# Patient Record
Sex: FEMALE | Race: WHITE
[De-identification: names, ages, dates, MRNs, and addresses within clinical notes are randomized per-mention and may not be internally consistent; named-entity substitution may affect disease eponyms.]

---

## 2018-02-26 ENCOUNTER — HOSPITAL ENCOUNTER (INPATIENT)
Dept: HOSPITAL 74 - FM/S | Age: 78
LOS: 2 days | Discharge: HOME HEALTH | DRG: 470 | End: 2018-02-28
Attending: ORTHOPAEDIC SURGERY | Admitting: ORTHOPAEDIC SURGERY
Payer: COMMERCIAL

## 2018-02-26 VITALS — BODY MASS INDEX: 26.9 KG/M2

## 2018-02-26 DIAGNOSIS — Z87.891: ICD-10-CM

## 2018-02-26 DIAGNOSIS — E03.9: ICD-10-CM

## 2018-02-26 DIAGNOSIS — M17.12: Primary | ICD-10-CM

## 2018-02-26 PROCEDURE — 0SRD0JZ REPLACEMENT OF LEFT KNEE JOINT WITH SYNTHETIC SUBSTITUTE, OPEN APPROACH: ICD-10-PCS | Performed by: ORTHOPAEDIC SURGERY

## 2018-02-26 PROCEDURE — 8E0Y0CZ ROBOTIC ASSISTED PROCEDURE OF LOWER EXTREMITY, OPEN APPROACH: ICD-10-PCS | Performed by: ORTHOPAEDIC SURGERY

## 2018-02-26 RX ADMIN — ACETAMINOPHEN SCH: 325 TABLET ORAL at 20:00

## 2018-02-26 RX ADMIN — CELECOXIB SCH MG: 200 CAPSULE ORAL at 21:41

## 2018-02-26 RX ADMIN — GABAPENTIN SCH MG: 300 CAPSULE ORAL at 21:41

## 2018-02-26 RX ADMIN — OXYCODONE HYDROCHLORIDE AND ACETAMINOPHEN SCH MG: 500 TABLET ORAL at 21:41

## 2018-02-26 RX ADMIN — KETOROLAC TROMETHAMINE SCH MG: 30 INJECTION INTRAMUSCULAR; INTRAVENOUS at 18:20

## 2018-02-26 RX ADMIN — OXYCODONE HYDROCHLORIDE SCH MG: 10 TABLET, FILM COATED, EXTENDED RELEASE ORAL at 21:41

## 2018-02-26 RX ADMIN — DOCUSATE SODIUM,SENNOSIDES SCH TABLET: 50; 8.6 TABLET, FILM COATED ORAL at 21:40

## 2018-02-26 RX ADMIN — ONDANSETRON PRN MG: 2 INJECTION INTRAMUSCULAR; INTRAVENOUS at 19:38

## 2018-02-26 NOTE — HP
Admitting History and Physical





- Admission


Chief Complaint: left knee osteoarthritis x years


History of Present Illness: 


77-year-old female presenting in regard to her left knee. Long-standing history 

of knee osteoarthritis. Patient complains of pain, limited range of motion, 

difficulty ambulating, and difficulty completing activities of daily living. 

Patient has failed conservative treatment including PO medication, injections, 

activity modification, and exercise program. Patient would like to proceed with 

a left total knee arthroplasty, MAKOplasty.





History Source: Patient





- Past Medical History


Endocrine: Yes: Hypothyroidism





- Past Surgical History


Additional Past Surgical History: 


See written history and physical.








- Advance Directives


Advance Directives: Yes: Health Care Proxy





- Smoking History


Smoking history: Former smoker


Have you smoked in the past 12 months: No


Aproximately how many cigarettes per day: 2


If you are a former smoker, when did you quit?: 1990





- Alcohol/Substance Use


Hx Alcohol Use: Yes (RARE WINE ON A HOLIDAY)





Home Medications





- Allergies


Allergies/Adverse Reactions: 


 Allergies











Allergy/AdvReac Type Severity Reaction Status Date / Time


 


No Known Allergies Allergy   Verified 02/19/18 12:24














- Home Medications


Home Medications: 


Ambulatory Orders





Levothyroxine [Synthroid -] 112 mcg PO DAILY 02/19/18 











Review of Systems





- Review of Systems


Musculoskeletal: reports: Crepitus (left knee), Decreased ROM (left knee), 

Joint Pain (left knee), Joint Swelling (left knee)





Physical Examination


Constitutional: Yes: Well Nourished, No Distress


Eyes: Yes: Conjunctiva Clear


HENT: Yes: Atraumatic, Normocephalic


Neck: Yes: Supple


Cardiovascular: Yes: Regular Rate and Rhythm


Respiratory: Yes: Regular


Gastrointestinal: Yes: Soft


...Rectal Exam: Yes: Deferred


Musculoskeletal: Yes: Joint Stiffness (left knee), Joint Swelling (left knee)





Assessment/Plan


77-year-old female presenting in regard to her left knee. Long-standing history 

of left knee osteoarthritis. Patient complains of pain, limited range of motion

, difficulty ambulating, and difficulty completing activities of daily living. 

Patient has failed conservative treatment including PO medication, injections, 

activity modification, and exercise program. Pros, cons, wrists, and benefits 

of a left total knee arthroplasty, MAKOplasty were explained in detail to the 

patient. Patient confirms her understanding and wishes to proceed with a left 

total knee arthroplasty, MAKOplasty.

## 2018-02-26 NOTE — OP
Operative Note





- Note:


Operative Date: 02/26/18


Pre-Operative Diagnosis: Left knee OA


Operation: MAKOplasty left TKA


Post-Operative Diagnosis: Same as Pre-op


Surgeon: Brian Lemus


Assistant: Jacqueline Roque


Anesthesia: Spinal


Estimated Blood Loss (mls): 100

## 2018-02-27 VITALS — HEART RATE: 70 BPM

## 2018-02-27 LAB
ANION GAP SERPL CALC-SCNC: 4 MMOL/L (ref 8–16)
BUN SERPL-MCNC: 19 MG/DL (ref 7–18)
CALCIUM SERPL-MCNC: 8.4 MG/DL (ref 8.4–10.2)
CHLORIDE SERPL-SCNC: 107 MMOL/L (ref 98–107)
CO2 SERPL-SCNC: 25 MMOL/L (ref 22–28)
CREAT SERPL-MCNC: 0.8 MG/DL (ref 0.6–1.3)
DEPRECATED RDW RBC AUTO: 13.3 % (ref 11.6–15.6)
GLUCOSE SERPL-MCNC: 162 MG/DL (ref 74–106)
HCT VFR BLD CALC: 32.8 % (ref 32.4–45.2)
HGB BLD-MCNC: 11.6 GM/DL (ref 10.7–15.3)
MCH RBC QN AUTO: 31 PG (ref 25.7–33.7)
MCHC RBC AUTO-ENTMCNC: 35.4 G/DL (ref 32–36)
MCV RBC: 87.7 FL (ref 80–96)
PLATELET # BLD AUTO: 271 K/MM3 (ref 134–434)
PMV BLD: 7.2 FL (ref 7.5–11.1)
POTASSIUM SERPLBLD-SCNC: 4.1 MMOL/L (ref 3.5–5.1)
RBC # BLD AUTO: 3.75 M/MM3 (ref 3.6–5.2)
SODIUM SERPL-SCNC: 136 MMOL/L (ref 136–145)
WBC # BLD AUTO: 11.9 K/MM3 (ref 4–10.8)

## 2018-02-27 RX ADMIN — DOCUSATE SODIUM,SENNOSIDES SCH TABLET: 50; 8.6 TABLET, FILM COATED ORAL at 21:49

## 2018-02-27 RX ADMIN — ACETAMINOPHEN SCH MG: 325 TABLET ORAL at 17:55

## 2018-02-27 RX ADMIN — ACETAMINOPHEN SCH MG: 325 TABLET ORAL at 06:19

## 2018-02-27 RX ADMIN — PANTOPRAZOLE SODIUM SCH MG: 40 TABLET, DELAYED RELEASE ORAL at 10:06

## 2018-02-27 RX ADMIN — CELECOXIB SCH MG: 200 CAPSULE ORAL at 10:06

## 2018-02-27 RX ADMIN — KETOROLAC TROMETHAMINE SCH MG: 30 INJECTION INTRAMUSCULAR; INTRAVENOUS at 00:02

## 2018-02-27 RX ADMIN — MULTIVITAMIN TABLET SCH TAB: TABLET at 10:06

## 2018-02-27 RX ADMIN — ONDANSETRON PRN MG: 2 INJECTION INTRAMUSCULAR; INTRAVENOUS at 06:18

## 2018-02-27 RX ADMIN — ASPIRIN 325 MG ORAL TABLET SCH MG: 325 PILL ORAL at 08:06

## 2018-02-27 RX ADMIN — KETOROLAC TROMETHAMINE SCH MG: 30 INJECTION INTRAMUSCULAR; INTRAVENOUS at 06:20

## 2018-02-27 RX ADMIN — ACETAMINOPHEN SCH MG: 325 TABLET ORAL at 12:10

## 2018-02-27 RX ADMIN — ACETAMINOPHEN SCH MG: 325 TABLET ORAL at 00:02

## 2018-02-27 RX ADMIN — OXYCODONE HYDROCHLORIDE AND ACETAMINOPHEN SCH MG: 500 TABLET ORAL at 10:06

## 2018-02-27 RX ADMIN — OXYCODONE HYDROCHLORIDE SCH MG: 10 TABLET, FILM COATED, EXTENDED RELEASE ORAL at 21:50

## 2018-02-27 RX ADMIN — CELECOXIB SCH MG: 200 CAPSULE ORAL at 21:49

## 2018-02-27 RX ADMIN — GABAPENTIN SCH MG: 300 CAPSULE ORAL at 10:06

## 2018-02-27 RX ADMIN — OXYCODONE HYDROCHLORIDE AND ACETAMINOPHEN SCH MG: 500 TABLET ORAL at 21:49

## 2018-02-27 RX ADMIN — LEVOTHYROXINE SODIUM SCH MCG: 112 TABLET ORAL at 06:19

## 2018-02-27 RX ADMIN — GABAPENTIN SCH MG: 300 CAPSULE ORAL at 21:49

## 2018-02-27 RX ADMIN — DOCUSATE SODIUM,SENNOSIDES SCH TABLET: 50; 8.6 TABLET, FILM COATED ORAL at 10:07

## 2018-02-27 NOTE — PN
Progress Note (short form)





- Note


Progress Note: 





77F POD1 s/p L TKR under spinal anesthetic and peripheral nerve blocks for post 

operative pain relief.


Pt states that pain is well controlled, reports no anesthetic complications. 


AVSS. Sensory and motor function intact in bilateral lower extremities.


Continue current regimen.

## 2018-02-27 NOTE — PN
Progress Note (short form)





- Note


Progress Note: 





Pt seen and examined.  Doing well.





AVSS





 Selected Entries











  02/27/18 02/27/18





  20:54 22:04


 


Temperature  98.0 F


 


Pulse Rate  70


 


Respiratory  18





Rate  


 


Blood Pressure  97/50


 


O2 Sat by Pulse 96 95





Oximetry (%)  








 Laboratory Tests











  02/27/18 02/27/18





  07:40 07:40


 


WBC  11.9 H 


 


Hgb  11.6 


 


Hct  32.8 


 


Plt Count  271 


 


Sodium   136


 


Potassium   4.1


 


Chloride   107


 


Carbon Dioxide   25


 


Anion Gap   4 L


 


BUN   19 H


 


Creatinine   0.8


 


Random Glucose   162 H


 


Calcium   8.4











Gen: NAD





LLE:  c/d/i, NVID





A/P 78yo female POD#1 s/p L JAYY TKA





1.  PT/OOB





2.  D/C in AM after PT; f/u in office in 10-14 days.

## 2018-02-28 VITALS — TEMPERATURE: 98.1 F | SYSTOLIC BLOOD PRESSURE: 106 MMHG | DIASTOLIC BLOOD PRESSURE: 51 MMHG

## 2018-02-28 LAB
ANION GAP SERPL CALC-SCNC: 3 MMOL/L (ref 8–16)
BUN SERPL-MCNC: 23 MG/DL (ref 7–18)
CALCIUM SERPL-MCNC: 8.2 MG/DL (ref 8.4–10.2)
CHLORIDE SERPL-SCNC: 107 MMOL/L (ref 98–107)
CO2 SERPL-SCNC: 27 MMOL/L (ref 22–28)
CREAT SERPL-MCNC: 0.8 MG/DL (ref 0.6–1.3)
DEPRECATED RDW RBC AUTO: 13.3 % (ref 11.6–15.6)
GLUCOSE SERPL-MCNC: 85 MG/DL (ref 74–106)
HCT VFR BLD CALC: 30.2 % (ref 32.4–45.2)
HGB BLD-MCNC: 10.3 GM/DL (ref 10.7–15.3)
MCH RBC QN AUTO: 30 PG (ref 25.7–33.7)
MCHC RBC AUTO-ENTMCNC: 34 G/DL (ref 32–36)
MCV RBC: 88.2 FL (ref 80–96)
PLATELET # BLD AUTO: 258 K/MM3 (ref 134–434)
PMV BLD: 7.3 FL (ref 7.5–11.1)
POTASSIUM SERPLBLD-SCNC: 3.7 MMOL/L (ref 3.5–5.1)
RBC # BLD AUTO: 3.43 M/MM3 (ref 3.6–5.2)
SODIUM SERPL-SCNC: 137 MMOL/L (ref 136–145)
WBC # BLD AUTO: 9.8 K/MM3 (ref 4–10.8)

## 2018-02-28 RX ADMIN — LEVOTHYROXINE SODIUM SCH MCG: 112 TABLET ORAL at 06:56

## 2018-02-28 RX ADMIN — CELECOXIB SCH MG: 200 CAPSULE ORAL at 09:50

## 2018-02-28 RX ADMIN — OXYCODONE HYDROCHLORIDE AND ACETAMINOPHEN SCH MG: 500 TABLET ORAL at 09:51

## 2018-02-28 RX ADMIN — GABAPENTIN SCH MG: 300 CAPSULE ORAL at 09:51

## 2018-02-28 RX ADMIN — DOCUSATE SODIUM,SENNOSIDES SCH TABLET: 50; 8.6 TABLET, FILM COATED ORAL at 09:52

## 2018-02-28 RX ADMIN — ACETAMINOPHEN SCH MG: 325 TABLET ORAL at 06:55

## 2018-02-28 RX ADMIN — MULTIVITAMIN TABLET SCH TAB: TABLET at 09:52

## 2018-02-28 RX ADMIN — ACETAMINOPHEN SCH: 325 TABLET ORAL at 05:52

## 2018-02-28 RX ADMIN — OXYCODONE HYDROCHLORIDE SCH MG: 10 TABLET, FILM COATED, EXTENDED RELEASE ORAL at 09:50

## 2018-02-28 RX ADMIN — ASPIRIN 325 MG ORAL TABLET SCH MG: 325 PILL ORAL at 09:52

## 2018-02-28 RX ADMIN — PANTOPRAZOLE SODIUM SCH MG: 40 TABLET, DELAYED RELEASE ORAL at 09:52

## 2018-02-28 NOTE — ED.PROV
Physicial Exam





I saw and examined the patient.








- Vital Signs


 Last Vital Signs











Temp Pulse Resp BP Pulse Ox


 


 98.0 F   70   18   97/50   95 


 


 02/27/18 22:04  02/27/18 22:04  02/27/18 22:04  02/27/18 22:04  02/27/18 22:04














- Physical Exam


Reason for Response: 





02/28/18 06:18


At approximately 8pm, I was asked to evaluate patient who sustained mechanical 

fall while in the bathroom.


General Appearance: Yes: Nourished.  No: Apparent Distress


HEENT: positive: Normal Voice, Symmetrical, Other (atraumatic, normocephalic)


Neck: negative: Tender


Respiratory/Chest: positive: Lungs Clear


Cardiovascular: positive: Regular Rhythm, Regular Rate


Musculoskeletal: positive: Other (no tenderness to hips, R knee, ankles 

bilaterally, +full ROM of R hip and knee. L knee postop - was wrapped, 

nontender. No midline C/T/L spine ttp.)


Extremity: positive: Normal Capillary Refill, Normal Inspection, Normal Range 

of Motion, Pelvis Stable.  negative: Tender, Delayed Capillary Refill, Swelling


Neurologic: positive: Fully Oriented, Alert, Normal Mood/Affect, Normal Response

, Motor Strength 5/5





Critical Care Time/MDM Note





- Medical Decision Making


Note: 





02/28/18 06:21


Pt was seen at approx 8pm 2/27/18 s/p mechanical fall. Pt was found in bed, 

well appearing, conversant and in no distress. Airway intact, breathing 

comfortably, AOx3, GCS 15. Denied head trauma, LOC. Essentially slipped while 

in the bathroom with her walker and fell onto her R side. Was able to ambulate 

after the fall and denied pain. Denied head strike, LOC. No midline spinal 

tenderness to suggest bony spinal injury. Non-tender hip, able to flex hip/knee/

ankle without restriction - very very low suspicion for bony injury. No imaging 

was ordered. Incident report was completed on the floor and pt was left in good 

condition.

## 2018-03-01 NOTE — PATH
Surgical Pathology Report



Patient Name:  MINDY HARPER

Accession #:  

Med. Rec. #:  [id number]                                                        

   /Age/Gender:  1940 (Age: 77) / F

Account:  [id number]                                                          

             Location: [facility]

Taken:  2018

Received:  2018

Reported:  3/1/2018

Physicians:  Brian Lemus M.D.

  



Specimen(s) Received

 LEFT KNEE BONE TISSUE 





Clinical History

Osteoarthritis left knee







Final Diagnosis

BONE AND SOFT TISSUE, KNEE, LEFT, TOTAL KNEE REPLACEMENT MAKOPLASTY:

BONE WITH DEGENERATIVE JOINT DISEASE AND FIBROADIPOSE TISSUE.





***Electronically Signed***

Mindy Hughes M.D.





Gross Description

Received in formalin labeled "left knee bone and soft tissue," is a 10.5 x 10.0

x 3.0 cm aggregate of multiple tan, irregular portions of bone and soft tissue.

The tibial plateau measures 7.0 x 4.5 x 1.5 cm. There is a 3.5 cm greatest

dimension area of eburnation identified. Remaining articular surfaces are

tan-yellow and diffusely granular. The underlying trabecular bone is yellow and

hard. Representative sections are submitted in one cassette, following

decalcification.